# Patient Record
Sex: MALE | Race: BLACK OR AFRICAN AMERICAN | URBAN - METROPOLITAN AREA
[De-identification: names, ages, dates, MRNs, and addresses within clinical notes are randomized per-mention and may not be internally consistent; named-entity substitution may affect disease eponyms.]

---

## 2020-02-05 ENCOUNTER — EMERGENCY (EMERGENCY)
Facility: HOSPITAL | Age: 45
LOS: 1 days | Discharge: HOME | End: 2020-02-05
Admitting: EMERGENCY MEDICINE
Payer: SELF-PAY

## 2020-02-05 VITALS
TEMPERATURE: 98 F | OXYGEN SATURATION: 98 % | HEIGHT: 72 IN | SYSTOLIC BLOOD PRESSURE: 157 MMHG | DIASTOLIC BLOOD PRESSURE: 91 MMHG | RESPIRATION RATE: 18 BRPM | WEIGHT: 210.1 LBS | HEART RATE: 91 BPM
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
## 2020-02-05 DIAGNOSIS — S99.921A UNSPECIFIED INJURY OF RIGHT FOOT, INITIAL ENCOUNTER: ICD-10-CM
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
## 2020-02-05 DIAGNOSIS — M79.671 PAIN IN RIGHT FOOT: ICD-10-CM

## 2020-02-05 PROCEDURE — 99284 EMERGENCY DEPT VISIT MOD MDM: CPT

## 2020-02-05 PROCEDURE — 73630 X-RAY EXAM OF FOOT: CPT

## 2020-02-05 PROCEDURE — 99283 EMERGENCY DEPT VISIT LOW MDM: CPT

## 2020-02-05 PROCEDURE — 73630 X-RAY EXAM OF FOOT: CPT | Mod: 26,RT

## 2020-02-05 RX ORDER — TRAMADOL HYDROCHLORIDE 50 MG/1
50 TABLET ORAL ONCE
Refills: 0 | Status: DISCONTINUED | OUTPATIENT
Start: 2020-02-05 | End: 2020-02-05

## 2020-02-05 RX ORDER — IBUPROFEN 200 MG
600 TABLET ORAL ONCE
Refills: 0 | Status: COMPLETED | OUTPATIENT
Start: 2020-02-05 | End: 2020-02-05

## 2020-02-05 RX ADMIN — TRAMADOL HYDROCHLORIDE 50 MILLIGRAM(S): 50 TABLET ORAL at 22:22

## 2020-02-05 RX ADMIN — TRAMADOL HYDROCHLORIDE 50 MILLIGRAM(S): 50 TABLET ORAL at 23:25

## 2020-02-05 NOTE — ED PROVIDER NOTE - NSFOLLOWUPINSTRUCTIONS_ED_ALL_ED_FT
I have discussed the discharge plan with the patient. The patient agrees with the plan, as discussed.  The patient understands Emergency Department diagnosis is a preliminary diagnosis often based on limited information and that the patient must adhere to the follow-up plan as discussed.  The patient understands that if the symptoms worsen or if prescribed medications do not have the desired/planned effect that the patient may return to the Emergency Department at any time for further evaluation and treatment.      Foot Contusion  A foot contusion is a deep bruise to the foot. Contusions are the result of an injury to tissues and muscle fibers under the skin. The injury causes bleeding under the skin. The skin over the contusion may turn blue, purple, or yellow. Minor injuries will cause a painless contusion, but more severe contusions may stay painful and swollen for a few weeks.  What are the causes?  This condition is usually caused by a hard hit or direct force to your foot, such as having a heavy object fall on your foot.  What are the signs or symptoms?  Symptoms of this condition include:  Swelling of the foot.Pain and tenderness of the foot.Discoloration of the foot. The area may have redness and then turn blue, purple, or yellow.How is this diagnosed?  This condition may be diagnosed based on:  Your medical history.A physical exam.In some cases, imaging tests may be done to check for other injuries. These may include:  An X-ray to check for broken bones (fractures).CT scan or MRI to check for torn or injured ligaments.How is this treated?  In general, the best treatment for a foot contusion is rest, ice, pressure (compression), and elevation. This is often called RICE therapy. An elastic wrap may be recommended to support your foot. Over-the-counter anti-inflammatory medicines may also be recommended for pain control. If your swelling or pain is severe, you may be given crutches.  Follow these instructions at home:  RICE therapy        Rest the injured area. Try to avoid standing or walking while your foot is painful.If directed, put ice on the injured area.  Put ice in a plastic bag.Place a towel between your skin and the bag.Leave the ice on for 20 minutes, 2–3 times a day.If directed, apply light compression to the injured area using an elastic wrap. Make sure the wrap is not too tight. Remove and reapply the wrap as told by your health care provider. If your toes become numb, cold, or blue, take the wrap off and reapply it more loosely.Raise (elevate) the injured area above the level of your heart while you are sitting or lying down.General instructions     Take over-the-counter and prescription medicines only as told by your health care provider.Use crutches as told by your health care provider, if this applies. Do not use the injured foot to support your body weight until your health care provider says that you can.Do not use any products that contain nicotine or tobacco, such as cigarettes, e-cigarettes, and chewing tobacco. These can delay healing. If you need help quitting, ask your health care provider.Keep all follow-up visits as told by your health care provider. This is important.Contact a health care provider if:  Your symptoms do not improve after several days of treatment.You have redness, swelling, or pain in your foot or toes.You have difficulty moving the injured area.Your swelling or pain is not relieved with medicines.Get help right away if:  You have severe pain.Your foot or toes become numb.Your foot or toes become pale or cold.You cannot move your foot or ankle.Your foot is warm to the touch.Summary  A foot contusion is a deep bruise to the foot.This condition is usually caused by a hard hit or direct force to your foot.Symptoms include swelling, pain, and discoloration in the injured area.In general, the best treatment for a foot contusion is rest, ice, pressure (compression), and elevation.This information is not intended to replace advice given to you by your health care provider. Make sure you discuss any questions you have with your health care provider.

## 2020-02-05 NOTE — ED PROVIDER NOTE - CLINICAL SUMMARY MEDICAL DECISION MAKING FREE TEXT BOX
45 yo male in the ER due to work related injury. Heavy metal object fell on his right foot. neurovascular injured extremity intact, no obvious deformity or discoloration on exam. xray done and as per prelim report - no acute fx seen.   Plan: pain control ACE bandage , ortho shoe, crutches and d/c for out pt ortho f/u.

## 2020-02-05 NOTE — ED PROVIDER NOTE - MUSCULOSKELETAL, MLM
Spine and all extremities grossly appears normal, slight edema and tenderness to the dorsum of right foot, good distal pulses, normal sensations, no tenderness to b/l malleoli and normal dorsiflexion/plantar extension of right foot

## 2020-02-05 NOTE — ED PROVIDER NOTE - NSFOLLOWUPCLINICS_GEN_ALL_ED_FT
[facility]  Podiatry  [address] [place] [address]  Phone: [phone]  Fax:   Follow Up Time:

## 2020-02-05 NOTE — ED PROVIDER NOTE - CARE PLAN
Principal Discharge DX:	Work related injury  Secondary Diagnosis:	Foot injury, right, initial encounter

## 2020-02-05 NOTE — ED PROVIDER NOTE - PATIENT PORTAL LINK FT
You can access the FollowMyHealth Patient Portal offered by [facility] by registering at the following website: [email or web address]. By joining [facility]’s FollowMyHealth portal, you will also be able to view your health information using other applications (apps) compatible with our system.

## 2020-02-05 NOTE — ED PROVIDER NOTE - CARE PROVIDER_API CALL
Wicho Beasley)  Orthopaedic Surgery  [address] [place] [address]  Phone: [phone]  Fax: [phone]  Follow Up Time:

## 2020-02-05 NOTE — ED ADULT NURSE NOTE - NSIMPLEMENTINTERV_GEN_ALL_ED
Implemented All Fall Risk Interventions:  [place] to call system. Call bell, personal items and telephone within reach. Instruct patient to call for assistance. Room bathroom lighting operational. Non-slip footwear when patient is off stretcher. Physically safe environment: no spills, clutter or unnecessary equipment. Stretcher in lowest position, wheels locked, appropriate side rails in place. Provide visual cue, wrist band, yellow gown, etc. Monitor gait and stability. Monitor for mental status changes and reorient to person, place, and time. Review medications for side effects contributing to fall risk. Reinforce activity limits and safety measures with patient and family.

## 2020-02-05 NOTE — ED PROVIDER NOTE - OBJECTIVE STATEMENT
43 yo generally healthy male in the Er due to work related injury. Pt mentioned that heavy metal object fell on his right foot. Pt was not able to ambualte due to pain in his foot. No obvious deformity or discoloration noted to injured extremity. pt denies numbness or tingling, denies any other injury.
